# Patient Record
Sex: FEMALE | Race: WHITE | NOT HISPANIC OR LATINO | Employment: UNEMPLOYED | URBAN - METROPOLITAN AREA
[De-identification: names, ages, dates, MRNs, and addresses within clinical notes are randomized per-mention and may not be internally consistent; named-entity substitution may affect disease eponyms.]

---

## 2019-12-03 DIAGNOSIS — O40.9XX0 POLYHYDRAMNIOS AFFECTING PREGNANCY: Primary | ICD-10-CM

## 2019-12-06 DIAGNOSIS — O40.2XX0 POLYHYDRAMNIOS IN SECOND TRIMESTER, SINGLE OR UNSPECIFIED FETUS: Primary | ICD-10-CM

## 2019-12-09 ENCOUNTER — INITIAL CONSULT (OUTPATIENT)
Dept: MATERNAL FETAL MEDICINE | Facility: CLINIC | Age: 39
End: 2019-12-09
Payer: MEDICAID

## 2019-12-09 VITALS
SYSTOLIC BLOOD PRESSURE: 108 MMHG | DIASTOLIC BLOOD PRESSURE: 64 MMHG | HEIGHT: 60 IN | BODY MASS INDEX: 41.03 KG/M2 | WEIGHT: 209 LBS | OXYGEN SATURATION: 99 % | RESPIRATION RATE: 20 BRPM | HEART RATE: 96 BPM

## 2019-12-09 DIAGNOSIS — O40.2XX0 POLYHYDRAMNIOS IN SECOND TRIMESTER, SINGLE OR UNSPECIFIED FETUS: ICD-10-CM

## 2019-12-09 PROCEDURE — 99203 PR OFFICE/OUTPT VISIT, NEW, LEVL III, 30-44 MIN: ICD-10-PCS | Mod: TH,25,S$GLB, | Performed by: OBSTETRICS & GYNECOLOGY

## 2019-12-09 PROCEDURE — 76811 PR US, OB FETAL EVAL & EXAM, TRANSABDOM,FIRST GESTATION: ICD-10-PCS | Mod: S$GLB,,, | Performed by: OBSTETRICS & GYNECOLOGY

## 2019-12-09 PROCEDURE — 76811 OB US DETAILED SNGL FETUS: CPT | Mod: S$GLB,,, | Performed by: OBSTETRICS & GYNECOLOGY

## 2019-12-09 PROCEDURE — 99203 OFFICE O/P NEW LOW 30 MIN: CPT | Mod: TH,25,S$GLB, | Performed by: OBSTETRICS & GYNECOLOGY

## 2019-12-09 RX ORDER — ACYCLOVIR 400 MG/1
TABLET ORAL
COMMUNITY
Start: 2019-10-08

## 2019-12-09 NOTE — PROGRESS NOTES
Initial MFM Consultation  Referring provider: Dr. Marquis    Indications for referral:  1) Pregnancy at 27 weeks and 6 days gestation (EDC 3-3-20)  2) Polyhydramnios    Dear Dr. Marquis,  Thank you for your kind request for consultation and imaging of your patient  at [facility].  She presents due to the above listed indications.  Her history was reviewed and is documented in Epic. 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  She had a serum screen positive for Trisomy 21 followed by a low risk cffDNA screen.  She is currently prescribed 17-OHP.  She has no drug allergies.  Her review of systems is positive for occasional contractions.    Physical Exam  Vital signs are normal.  General: Obese, age appearing female in no apparent distress.  HEENT:  Normal external facial features. No scleral icterus.  CHEST:  Normal respiratory effort and able to speak in sentences without difficulty.  ABDOMEN:  Gravid,obese, soft, nontender  EXTREMITIES: Without clubbing, cyanosis, edema  NEURO: No focal deficits.  MENTAL STATUS: No focal deficits.    ULTRASOUND FINDINGS:  A detailed fetal anatomic survey was performed with suboptimal views of the cardiac outflow tracts due to fetal positioning. EFW of 1494g is at the 75th percentile.  The placenta is posterior and fundal.  There is polyhydramnios with MVP of 11cm. There are no fetal structural malformations observed to extent of view.    IMPRESSION:  27 week gestation complicated by GDM, AMA, history of PTL, obesity, and polyhydramnios on ultrasound.    RECOMMENDATIONS/DISCUSSION:  1) We discussed the increased risk for stillbirth and other risks associated with GDM.   2) Serial ultrasounds for growth are recommended.  The next has been scheduled in our office.  3) Twice weekly  testing should be performed  once 32 weeks given her co-morbidities.  This can be done in your office.  4) Delivery timing will be discussed later in pregnancy.  5) We discussed glycemic targets to improve outcomes.  She was provided information on how to report her log to our [place] office.    Thank you for allowing us to participate in her care.  Please do not hesitate to call with questions.

## 2019-12-09 NOTE — PROGRESS NOTES
Magy is here for initial MFM consultation, referred by Dr. Marquis for polyhydramnios, history of PTL and delivery x 3 (she is on weekly [place] injections).    She is feeling fetal movement.    Magy denies vaginal bleeding, loss of fluid, recurrent contractions.    She states that she recently failed her 3hr GTT and has diabetic education tomorrow; she reports that she started checking her sugars and they are not elevated.      Vitals:    12/09/19 1021   BP: 108/64   Pulse: 96   Resp: 20   SpO2: 99%   Weight: 94.8 kg (209 lb)   Height: 5' (1.524 m)      BMI:                    40.82 kg/m^2

## 2019-12-09 NOTE — LETTER
December 9, 2019        Nato Marquis MD  [address]  [address]  [place] [address]             [facility]  [address]  [place] [address]  Phone: [phone]  Fax: [phone]   Patient: Magy Akhtar   MR Number: [id number]   YOB: 1980   Date of Visit: 12/9/2019       Dear Dr. Marquis:    Thank you for referring Magy Akhtar to me for evaluation. Attached you will find relevant portions of my assessment and plan of care.    If you have questions, please do not hesitate to call me. I look forward to following Magy Akhtar along with you.    Sincerely,      Syl Phillips MD        CC  No Recipients    Enclosure

## 2020-01-02 DIAGNOSIS — O40.9XX0 POLYHYDRAMNIOS AFFECTING PREGNANCY: Primary | ICD-10-CM

## 2020-01-05 LAB
APPEARANCE, UA: CLEAR
BILIRUB UR QL STRIP: NEGATIVE MG/DL
COLOR UR: NORMAL
GLUCOSE (UA): NORMAL MG/DL
HGB UR QL STRIP: NEGATIVE /UL
KETONES UR QL STRIP: NEGATIVE MG/DL
LEUKOCYTE ESTERASE UR QL STRIP: NEGATIVE /UL
NITRITE UR QL STRIP: NEGATIVE
PH UR STRIP: 6 PH (ref 5–9)
PROT UR QL STRIP: NEGATIVE MG/DL
SP GR UR STRIP: 1 (ref 1–1.03)
SPECIMEN COLLECTION METHOD, URINE: NORMAL
UROBILINOGEN UR STRIP-ACNC: NORMAL MG/DL

## 2020-01-09 ENCOUNTER — PROCEDURE VISIT (OUTPATIENT)
Dept: MATERNAL FETAL MEDICINE | Facility: CLINIC | Age: 40
End: 2020-01-09
Payer: MEDICAID

## 2020-01-09 VITALS
BODY MASS INDEX: 40.64 KG/M2 | OXYGEN SATURATION: 98 % | SYSTOLIC BLOOD PRESSURE: 108 MMHG | HEART RATE: 102 BPM | RESPIRATION RATE: 20 BRPM | WEIGHT: 207 LBS | DIASTOLIC BLOOD PRESSURE: 64 MMHG | HEIGHT: 60 IN

## 2020-01-09 DIAGNOSIS — O40.9XX0 POLYHYDRAMNIOS AFFECTING PREGNANCY: ICD-10-CM

## 2020-01-09 DIAGNOSIS — O09.529 ELDERLY MULTIGRAVIDA WITH ANTEPARTUM CONDITION OR COMPLICATION: Primary | ICD-10-CM

## 2020-01-09 PROCEDURE — 76816 OB US FOLLOW-UP PER FETUS: CPT | Mod: S$GLB,,, | Performed by: OBSTETRICS & GYNECOLOGY

## 2020-01-09 PROCEDURE — 76816 PR  US,PREGNANT UTERUS,F/U,TRANSABD APP: ICD-10-PCS | Mod: S$GLB,,, | Performed by: OBSTETRICS & GYNECOLOGY

## 2020-01-09 PROCEDURE — 76819 PR US, OB, FETAL BIOPHYSICAL, W/O NST: ICD-10-PCS | Mod: S$GLB,,, | Performed by: OBSTETRICS & GYNECOLOGY

## 2020-01-09 PROCEDURE — 99215 PR OFFICE/OUTPT VISIT, EST, LEVL V, 40-54 MIN: ICD-10-PCS | Mod: TH,25,S$GLB, | Performed by: OBSTETRICS & GYNECOLOGY

## 2020-01-09 PROCEDURE — 99215 OFFICE O/P EST HI 40 MIN: CPT | Mod: TH,25,S$GLB, | Performed by: OBSTETRICS & GYNECOLOGY

## 2020-01-09 PROCEDURE — 76819 FETAL BIOPHYS PROFIL W/O NST: CPT | Mod: S$GLB,,, | Performed by: OBSTETRICS & GYNECOLOGY

## 2020-01-09 RX ORDER — LACTULOSE 10 G/15ML
SOLUTION ORAL; RECTAL
COMMUNITY
Start: 2019-12-26

## 2020-01-09 RX ORDER — NIFEDIPINE 30 MG/1
TABLET, FILM COATED, EXTENDED RELEASE ORAL
COMMUNITY
Start: 2019-12-30

## 2020-01-09 RX ORDER — IBUPROFEN 600 MG/1
TABLET ORAL
COMMUNITY
Start: 2020-01-02

## 2020-01-09 NOTE — PROGRESS NOTES
Magy is here for followup [facility] consultation for diet controlled gestational diabetes in pregnancy, polyhydramnios, AMA with negative NIPT, and history of PT Delivery x 3 referred by Dr. Marquis.    She is feeling fetal movement.    Magy denies vaginal bleeding or loss of fluid. She has been in the hospital several times for  contractions and was started on Procardia 10mg Q6H prn, then changed to Procardia ER 30mg daily prn contractions with Motrin 600 mg PO 1 hour after Procardia if still having contractions. Cervix was closed last night when in L&D observation.    She did not bring her glucose log today; she called her sugars in to the [place] office 2 days ago.  She is not taking medications for control.          Vitals:    20 1019   BP: 108/64   Pulse: 102   Resp: 20   SpO2: 98%   Weight: 93.9 kg (207 lb)   Height: 5' (1.524 m)     BMI:                    40.43 kg/m^2

## 2020-01-09 NOTE — PROGRESS NOTES
Indication for follow up consultation:  Gestational diabetes  Polyhydramnios   contractions with a history of  delivery  Advanced maternal age    Provider requesting consultation:  Dr. Marquis    Dear Dr. Marquis    I had the pleasure of seeing Magy Akhtar for follow up consultation today.  As you recall she is a 39 y.o.  at 32w2d here for follow up consultation regarding gestational diabetes, advanced maternal age, polyhydramnios and prior history of  birth.    Of note the patient is currently taking nifedipine with ibuprofen for tocolysis for recurrent symptomatic contractions.  She was evaluated in Labor and delivery just yesterday and did not show any evidence of cervical change and thus was discharged home.    In regards to diabetes, she is currently diet controlled.  She since her glucose log to our office in [place] once per week for review.    Review of systems: The patient denies any vaginal bleeding, loss of fluid today.  Vitals:    20 1019   BP: 108/64   Pulse: 102   Resp: 20     Physical exam:  Gen: WDWN in NAD  HEENT: WNL  Abdomen: Soft, non-tender  Skin: No rash or jaundice  Extremities: No clubbing or cyanosis  Neuro: Grossly intact    Ultrasound:  A repeat detailed fetal anatomical survey was completed once again today.  There is a single intrauterine pregnancy in the cephalic presentation.  The estimated fetal weight is 2425 grams which is the 58th percentile for this gestational age. The fetus did not show any overt evidence of structure abnormalities.  The amniotic fluid volume is elevated at polyhydrdamnios of 30.0cm. The placenta does not show any evidence of previa.  Please see our official report for further specifics.    Recommendations:  Today I discussed with the patient the above ultrasound findings. The fetus does have some evidence of exposure to hyperglycemia as the abdominal circumference is out pacing the other biometric parameters and there is  the finding of polyhydramnios.  With that being said however her glucose levels that she was reporting are within normal limits and the starting her on an oral hypoglycemic could be detrimental.  I instructed the patient to continue to do the best she can with dietary modification.    I spent a fair amount of time also discussing with the patient her  contractions.  Being that her cervix has not changed I do not think she is actually experiencing  labor.  I have refilled her a prescription for nifedipine 10 mg every 6 hr for symptomatic relief.  I recommended that she can discontinue the ibuprofen at this gestational age.    From a followup standpoint I would recommend some form of fetal well-being testing in your office until delivery.  Either twice weekly nonstress test or once weekly biophysical profile would be acceptable.  I would continue with this testing up until delivery.  I would recommend repeat  section at 37 weeks secondary to polyhydramnios, enlarged abdominal circumference and gestational diabetes.  I would deliver her prior to that time if her fetal testing is nonreassuring.    At this late time I did not schedule any follow-up ultrasound appointments.  I do want her to continue to send her glucose log to our offices once per week until delivery.    Thanks once again for allowing us to participate in the care of your patients.  If you have any questions about today's consultation feel free to contact me or my partners at [phone].    Sincerely,

## 2020-01-09 NOTE — LETTER
January 9, 2020        Nato Marquis MD  [address]  [address]  [facility] [address]             [facility]  [address]  [place] [address]  Phone: [phone]  Fax: [phone]   Patient: Magy Akhtar   MR Number: [id number]   YOB: 1980   Date of Visit: 1/9/2020       Dear Dr. Marquis:    Thank you for referring Magy Ahktar to me for evaluation. Below are the relevant portions of my assessment and plan of care.            If you have questions, please do not hesitate to call me. I look forward to following Magy along with you.    Sincerely,      Gustavo Avalos MD      CC  No Recipients